# Patient Record
Sex: FEMALE | Race: WHITE | NOT HISPANIC OR LATINO
[De-identification: names, ages, dates, MRNs, and addresses within clinical notes are randomized per-mention and may not be internally consistent; named-entity substitution may affect disease eponyms.]

---

## 2018-01-12 ENCOUNTER — RESULT REVIEW (OUTPATIENT)
Age: 63
End: 2018-01-12

## 2019-04-10 ENCOUNTER — RESULT REVIEW (OUTPATIENT)
Age: 64
End: 2019-04-10

## 2020-07-05 ENCOUNTER — TRANSCRIPTION ENCOUNTER (OUTPATIENT)
Age: 65
End: 2020-07-05

## 2020-10-16 ENCOUNTER — TRANSCRIPTION ENCOUNTER (OUTPATIENT)
Age: 65
End: 2020-10-16

## 2021-06-09 ENCOUNTER — TRANSCRIPTION ENCOUNTER (OUTPATIENT)
Age: 66
End: 2021-06-09

## 2021-07-09 ENCOUNTER — TRANSCRIPTION ENCOUNTER (OUTPATIENT)
Age: 66
End: 2021-07-09

## 2021-07-29 ENCOUNTER — APPOINTMENT (OUTPATIENT)
Dept: UROLOGY | Facility: CLINIC | Age: 66
End: 2021-07-29
Payer: MEDICARE

## 2021-07-29 DIAGNOSIS — Z80.8 FAMILY HISTORY OF MALIGNANT NEOPLASM OF OTHER ORGANS OR SYSTEMS: ICD-10-CM

## 2021-07-29 DIAGNOSIS — Z78.9 OTHER SPECIFIED HEALTH STATUS: ICD-10-CM

## 2021-07-29 DIAGNOSIS — Z63.4 DISAPPEARANCE AND DEATH OF FAMILY MEMBER: ICD-10-CM

## 2021-07-29 DIAGNOSIS — Z80.3 FAMILY HISTORY OF MALIGNANT NEOPLASM OF BREAST: ICD-10-CM

## 2021-07-29 DIAGNOSIS — Z86.79 PERSONAL HISTORY OF OTHER DISEASES OF THE CIRCULATORY SYSTEM: ICD-10-CM

## 2021-07-29 PROCEDURE — 99203 OFFICE O/P NEW LOW 30 MIN: CPT

## 2021-07-29 SDOH — SOCIAL STABILITY - SOCIAL INSECURITY: DISSAPEARANCE AND DEATH OF FAMILY MEMBER: Z63.4

## 2021-07-29 NOTE — REVIEW OF SYSTEMS
[Constipation] : constipation [both] : pain during and after intercourse [denies] : denies pain with orgasm [Wake up at night to urinate  How many times?  ___] : wakes up to urinate [unfilled] times during the night [Anxiety] : anxiety [Negative] : Heme/Lymph [FreeTextEntry6] : painful urination

## 2021-07-29 NOTE — PHYSICAL EXAM
[Normal Appearance] : normal appearance [General Appearance - In No Acute Distress] : no acute distress [FreeTextEntry1] : normal peripheral circulation  [] : no respiratory distress [Normal Station and Gait] : the gait and station were normal for the patient's age [Skin Color & Pigmentation] : normal skin color and pigmentation [No Focal Deficits] : no focal deficits [Oriented To Time, Place, And Person] : oriented to person, place, and time

## 2021-07-29 NOTE — ASSESSMENT
[FreeTextEntry1] : Recurrent UTIs:\par Recommended: good oral hydration, timed voiding, urinate right after sex, change sanitary pads often, avoid douches, bubble baths and other feminine hygiene products. \par Recommended OTC Cranberry tablets. \par Recommended post coital prophylaxis with Macrobid 100 mg 30 mins after sexual activity. \par Discussed that if she keeps having UTIs options would be to either use low dose vaginal estrogen cream or low prophylactic antibiotics.\par \par Will get Urinalysis and Urine culture. \par Will get Renal and Bladder Ultrasound. \par Will inform results. \par \par Return to office in 3 months or sooner if any issues.

## 2021-07-29 NOTE — HISTORY OF PRESENT ILLNESS
[FreeTextEntry1] : 64 yo female presents for recurrent urinary tract infections. \par Had 2 urinary tract infection in last 5 weeks. Thinks related to sexual activity. \par UTIs are characterized by urinary frequency and urethral discomfort. \par Daytime frequency is 2-3 x  or so. Nocturia of 1-2 x. \par Denies dysuria, hematuria, lower abdominal or flank pain, fever, chills or rigors.\par No history of kidney stone. \par Non smoker. \par

## 2021-07-30 LAB
APPEARANCE: ABNORMAL
BACTERIA: ABNORMAL
BILIRUBIN URINE: NEGATIVE
BLOOD URINE: NEGATIVE
COLOR: YELLOW
GLUCOSE QUALITATIVE U: NEGATIVE
HYALINE CASTS: 4 /LPF
KETONES URINE: NEGATIVE
LEUKOCYTE ESTERASE URINE: ABNORMAL
MICROSCOPIC-UA: NORMAL
NITRITE URINE: NEGATIVE
PH URINE: 7
PROTEIN URINE: NORMAL
RED BLOOD CELLS URINE: 1 /HPF
SPECIFIC GRAVITY URINE: 1.03
SQUAMOUS EPITHELIAL CELLS: 0 /HPF
UROBILINOGEN URINE: NORMAL
WHITE BLOOD CELLS URINE: 135 /HPF

## 2021-08-06 ENCOUNTER — NON-APPOINTMENT (OUTPATIENT)
Age: 66
End: 2021-08-06

## 2021-08-10 LAB — BACTERIA UR CULT: ABNORMAL

## 2021-08-19 ENCOUNTER — APPOINTMENT (OUTPATIENT)
Dept: UROLOGY | Facility: CLINIC | Age: 66
End: 2021-08-19
Payer: MEDICARE

## 2021-08-19 VITALS
DIASTOLIC BLOOD PRESSURE: 75 MMHG | SYSTOLIC BLOOD PRESSURE: 117 MMHG | HEIGHT: 63 IN | HEART RATE: 65 BPM | WEIGHT: 147 LBS | BODY MASS INDEX: 26.05 KG/M2 | OXYGEN SATURATION: 98 %

## 2021-08-19 PROCEDURE — 99213 OFFICE O/P EST LOW 20 MIN: CPT

## 2021-08-20 LAB
APPEARANCE: CLEAR
BACTERIA: NEGATIVE
BILIRUBIN URINE: NEGATIVE
BLOOD URINE: NEGATIVE
COLOR: NORMAL
GLUCOSE QUALITATIVE U: NEGATIVE
HYALINE CASTS: 1 /LPF
KETONES URINE: NEGATIVE
LEUKOCYTE ESTERASE URINE: NEGATIVE
MICROSCOPIC-UA: NORMAL
NITRITE URINE: NEGATIVE
PH URINE: 6
PROTEIN URINE: NEGATIVE
RED BLOOD CELLS URINE: 0 /HPF
SPECIFIC GRAVITY URINE: 1.02
SQUAMOUS EPITHELIAL CELLS: 1 /HPF
UROBILINOGEN URINE: NORMAL
WHITE BLOOD CELLS URINE: 0 /HPF

## 2021-08-24 LAB — BACTERIA UR CULT: NORMAL

## 2021-08-26 ENCOUNTER — OUTPATIENT (OUTPATIENT)
Dept: OUTPATIENT SERVICES | Facility: HOSPITAL | Age: 66
LOS: 1 days | End: 2021-08-26
Payer: MEDICARE

## 2021-08-26 ENCOUNTER — APPOINTMENT (OUTPATIENT)
Dept: ULTRASOUND IMAGING | Facility: CLINIC | Age: 66
End: 2021-08-26
Payer: MEDICARE

## 2021-08-26 ENCOUNTER — TRANSCRIPTION ENCOUNTER (OUTPATIENT)
Age: 66
End: 2021-08-26

## 2021-08-26 DIAGNOSIS — Z87.440 PERSONAL HISTORY OF URINARY (TRACT) INFECTIONS: ICD-10-CM

## 2021-08-26 PROCEDURE — 76770 US EXAM ABDO BACK WALL COMP: CPT | Mod: 26

## 2021-08-26 PROCEDURE — 76770 US EXAM ABDO BACK WALL COMP: CPT

## 2021-08-29 NOTE — HISTORY OF PRESENT ILLNESS
[FreeTextEntry1] : 64 yo female presents for follow up. \par After last visit treated with Antibiotics.  \par Currently asymptomatic.  \par Renal and Bladder Ultrasound pending.  \par \par Initially seen for recurrent urinary tract infections. \par Had 2 urinary tract infection in last 5 weeks. Thinks related to sexual activity. \par UTIs are characterized by urinary frequency and urethral discomfort. \par Daytime frequency is 2-3 x  or so. Nocturia of 1-2 x. \par Denied dysuria, hematuria, lower abdominal or flank pain, fever, chills or rigors.\par No history of kidney stone. \par Non smoker. \par

## 2021-08-29 NOTE — ASSESSMENT
[FreeTextEntry1] : Recurrent urinary tract infections:\par Will get Urinalysis and Urine culture.\par Renal and Bladder Ultrasound pending. \par Will inform results. \par Recommended post coital prophylaxis with Macrobid 100 mg 30 mins after sexual activity. \par \par Return to office in 3 months or sooner if any issues.

## 2021-12-02 ENCOUNTER — APPOINTMENT (OUTPATIENT)
Dept: UROLOGY | Facility: CLINIC | Age: 66
End: 2021-12-02
Payer: MEDICARE

## 2021-12-02 VITALS
DIASTOLIC BLOOD PRESSURE: 76 MMHG | BODY MASS INDEX: 26.4 KG/M2 | OXYGEN SATURATION: 97 % | WEIGHT: 149 LBS | SYSTOLIC BLOOD PRESSURE: 133 MMHG | HEIGHT: 63 IN | HEART RATE: 60 BPM

## 2021-12-02 PROCEDURE — 51741 ELECTRO-UROFLOWMETRY FIRST: CPT

## 2021-12-02 PROCEDURE — 99214 OFFICE O/P EST MOD 30 MIN: CPT | Mod: 25

## 2021-12-02 PROCEDURE — 51798 US URINE CAPACITY MEASURE: CPT

## 2021-12-02 NOTE — ASSESSMENT
[FreeTextEntry1] : Recurrent urinary tract infections:\par Discussed Renal and Bladder Ultrasound. \par Continue post coital prophylaxis. \par \par Incomplete bladder emptying:\par See Uroflo and PVR. \par \par \par Return to office in 1 year or sooner if any issues.

## 2021-12-02 NOTE — HISTORY OF PRESENT ILLNESS
[FreeTextEntry1] : 67 yo female presents for follow up. \par Had informed of Renal and Bladder Ultrasound: increased PVR((on imaging 196 ml). \par Recommended to see Gynecology to rule out bladder prolapse. \par Saw Gynecology, no concern for bladder prolapse. \par Doing post coital prophylaxis, no new urinary tract infection. \par Daytime frequency is 2-3 x or so. Nocturia of 1-2 x. \par Has off and on sense of incomplete emptying and urinary incontinence. \par Denied dysuria, hematuria, lower abdominal or flank pain, fever, chills or rigors.\par \par Initially seen for recurrent urinary tract infections. \par Had 2 urinary tract infection in few weeks. Felt related to sexual activity. \par UTIs characterized by urinary frequency and urethral discomfort. \par Daytime frequency is 2-3 x  or so. Nocturia of 1-2 x. \par Denied dysuria, hematuria, lower abdominal or flank pain, fever, chills or rigors.\par No history of kidney stone. \par Non smoker. \par

## 2021-12-03 LAB
APPEARANCE: CLEAR
BILIRUBIN URINE: NEGATIVE
BLOOD URINE: NEGATIVE
COLOR: NORMAL
GLUCOSE QUALITATIVE U: NEGATIVE
KETONES URINE: NEGATIVE
LEUKOCYTE ESTERASE URINE: NEGATIVE
NITRITE URINE: NEGATIVE
PH URINE: 6
PROTEIN URINE: NEGATIVE
SPECIFIC GRAVITY URINE: 1.02
UROBILINOGEN URINE: NORMAL

## 2022-03-04 ENCOUNTER — TRANSCRIPTION ENCOUNTER (OUTPATIENT)
Age: 67
End: 2022-03-04

## 2022-06-12 ENCOUNTER — NON-APPOINTMENT (OUTPATIENT)
Age: 67
End: 2022-06-12

## 2022-10-19 ENCOUNTER — NON-APPOINTMENT (OUTPATIENT)
Age: 67
End: 2022-10-19

## 2023-02-07 ENCOUNTER — RX RENEWAL (OUTPATIENT)
Age: 68
End: 2023-02-07

## 2023-02-18 ENCOUNTER — NON-APPOINTMENT (OUTPATIENT)
Age: 68
End: 2023-02-18

## 2023-02-20 ENCOUNTER — NON-APPOINTMENT (OUTPATIENT)
Age: 68
End: 2023-02-20

## 2023-02-20 RX ORDER — NITROFURANTOIN (MONOHYDRATE/MACROCRYSTALS) 25; 75 MG/1; MG/1
100 CAPSULE ORAL
Qty: 14 | Refills: 0 | Status: COMPLETED | COMMUNITY
Start: 2021-08-03 | End: 2023-02-20

## 2023-02-21 ENCOUNTER — RX RENEWAL (OUTPATIENT)
Age: 68
End: 2023-02-21

## 2023-03-23 ENCOUNTER — APPOINTMENT (OUTPATIENT)
Dept: UROLOGY | Facility: CLINIC | Age: 68
End: 2023-03-23
Payer: MEDICARE

## 2023-03-23 VITALS
BODY MASS INDEX: 26.58 KG/M2 | HEIGHT: 63 IN | SYSTOLIC BLOOD PRESSURE: 134 MMHG | WEIGHT: 150 LBS | HEART RATE: 77 BPM | OXYGEN SATURATION: 97 % | DIASTOLIC BLOOD PRESSURE: 83 MMHG

## 2023-03-23 DIAGNOSIS — Z87.440 PERSONAL HISTORY OF URINARY (TRACT) INFECTIONS: ICD-10-CM

## 2023-03-23 PROCEDURE — 99213 OFFICE O/P EST LOW 20 MIN: CPT

## 2023-03-26 PROBLEM — Z87.440 RECENT URINARY TRACT INFECTION: Noted: 2021-07-29

## 2023-03-26 NOTE — HISTORY OF PRESENT ILLNESS
[FreeTextEntry1] : 66 yo female presents for follow up. \par Same urination.  \par Has occasional urinary incontinence with cough, sneeze and early morning.\par Doing post coital prophylaxis. \par Has developed Rheumatoid arthritis. On Methotrexate.\par Had labs with PCP today. \par \par On Renal and Bladder Ultrasound: increased PVR(on imaging 196 ml). \par Recommended to see Gynecology to rule out bladder prolapse. \par Saw Gynecology, no concern for bladder prolapse. \par \par Initially seen for recurrent urinary tract infections. \par Had 2 urinary tract infection in few weeks. Felt related to sexual activity. \par UTIs characterized by urinary frequency and urethral discomfort. \par Daytime frequency is 2-3 x  or so. Nocturia of 1-2 x. \par Denied dysuria, hematuria, lower abdominal or flank pain, fever, chills or rigors.\par No history of kidney stone. \par Non smoker. \par

## 2023-03-26 NOTE — ASSESSMENT
[FreeTextEntry1] : Recurrent urinary tract infections:\par Discussed treatment options. Will consider using Estrogen cream. \par Will continue post coital prophylaxis. \par \par Return to office in 1 year or sooner if any issues.

## 2023-06-09 ENCOUNTER — APPOINTMENT (OUTPATIENT)
Dept: ORTHOPEDIC SURGERY | Facility: CLINIC | Age: 68
End: 2023-06-09
Payer: MEDICARE

## 2023-06-09 VITALS — WEIGHT: 150 LBS | BODY MASS INDEX: 26.58 KG/M2 | HEIGHT: 63 IN

## 2023-06-09 DIAGNOSIS — Z87.39 PERSONAL HISTORY OF OTHER DISEASES OF THE MUSCULOSKELETAL SYSTEM AND CONNECTIVE TISSUE: ICD-10-CM

## 2023-06-09 DIAGNOSIS — Z00.00 ENCOUNTER FOR GENERAL ADULT MEDICAL EXAMINATION W/OUT ABNORMAL FINDINGS: ICD-10-CM

## 2023-06-09 PROCEDURE — 73110 X-RAY EXAM OF WRIST: CPT | Mod: LT

## 2023-06-09 PROCEDURE — 73080 X-RAY EXAM OF ELBOW: CPT | Mod: LT

## 2023-06-09 PROCEDURE — 99203 OFFICE O/P NEW LOW 30 MIN: CPT | Mod: 25

## 2023-06-09 PROCEDURE — A4565: CPT | Mod: LT,KX

## 2023-06-09 NOTE — IMAGING
[de-identified] : L elbow\par mild swelling, ecchymosis over inner elbow\par no deformity\par ttp radial head\par pain with pronation/supination\par \par L wrist\par no swelling, no deformity\par minimal ttp to distal radius\par full ROM\par neuro intact

## 2023-06-09 NOTE — HISTORY OF PRESENT ILLNESS
[10] : 10 [2] : 2 [Dull/Aching] : dull/aching [Localized] : localized [Sharp] : sharp [Constant] : constant [Retired] : Work status: retired [de-identified] : 67F here with left elbow and left wrist pain. She fell 6/7 in [place]\par She has elbow pain which radiats down her forearm. pain with pronation/supination\par PMH: RA [] : Post Surgical Visit: no [FreeTextEntry1] : Lt Forearm/elbow [FreeTextEntry3] : 6/7/23 [FreeTextEntry5] : Patient tripped and fell down while walking on the board walk landed on her left arm 6/7/23 [de-identified] : movement

## 2023-06-09 NOTE — ASSESSMENT
[FreeTextEntry1] : XR with displaced intra articular radial head fx\par Sling for a few days\par Ice, rest, nsaids\par FU mitgang wednesday

## 2023-06-14 ENCOUNTER — APPOINTMENT (OUTPATIENT)
Dept: ORTHOPEDIC SURGERY | Facility: CLINIC | Age: 68
End: 2023-06-14
Payer: MEDICARE

## 2023-06-14 VITALS — WEIGHT: 150 LBS | BODY MASS INDEX: 26.58 KG/M2 | HEIGHT: 63 IN

## 2023-06-14 PROCEDURE — 99214 OFFICE O/P EST MOD 30 MIN: CPT | Mod: 57

## 2023-06-14 PROCEDURE — 24650 CLTX RDL HEAD/NCK FX WO MNPJ: CPT | Mod: LT

## 2023-06-14 NOTE — ASSESSMENT
[FreeTextEntry1] : The patient was advised of the diagnosis.  The natural history of the pathology was explained in full to the patient in layman's terms. All questions were answered.  We discussed that the radial head has a function in mobility and support of the elbow joint.  Minimally displaced fractures of the radial head/neck generally do well with non operative treatment.  We reviewed that although many people lose terminal extension, this is tolerated and that functional range of motion is considered to be  flexion extension and 50/50 supination/pronation.  The patient understands that they need to work on range of motion exercises and that Xrays and range of motion will be re-evaluated in about 3 weeks.  Formal therapy may be needed.  For comfort I have advised the patient that they may wear a sling for up to 3 days after the injury but then should begin range of motion exercises to minimize stiffness. Prolonged sling wear may cause permanent stiffness.  Surgery is occasionally required for contracture release or fixation/radial head arthroplasty.  The patient verbalized understanding of the above.\par \par Start PT for NWB ROM\par F/u in 2 weeks for xrays

## 2023-06-14 NOTE — IMAGING
[de-identified] : L elbow\par mild swelling, ecchymosis over inner elbow\par no deformity\par ttp radial head\par pain with pronation/supination\par \par L wrist\par no swelling, no deformity\par minimal ttp to distal radius\par full ROM\par neuro intact

## 2023-06-14 NOTE — HISTORY OF PRESENT ILLNESS
[10] : 10 [2] : 2 [Dull/Aching] : dull/aching [Localized] : localized [Sharp] : sharp [Constant] : constant [Retired] : Work status: retired [de-identified] : 6/14/23:  [place] in [place] on 6/7 and injured her left elbow.  Pt was seen UC by MANAN Dias on 6/9/23.\par  [] : Post Surgical Visit: no [FreeTextEntry1] : Lt Forearm/elbow [FreeTextEntry3] : 6/7/23 [FreeTextEntry5] : Patient tripped and fell down while walking on the board walk landed on her left arm 6/7/23 [de-identified] : movement

## 2023-06-22 ENCOUNTER — APPOINTMENT (OUTPATIENT)
Dept: UROLOGY | Facility: CLINIC | Age: 68
End: 2023-06-22
Payer: MEDICARE

## 2023-06-22 PROCEDURE — 99214 OFFICE O/P EST MOD 30 MIN: CPT

## 2023-06-23 LAB
APPEARANCE: CLEAR
BACTERIA: NEGATIVE /HPF
BILIRUBIN URINE: NEGATIVE
BLOOD URINE: NEGATIVE
CAST: 0 /LPF
COLOR: YELLOW
EPITHELIAL CELLS: 1 /HPF
GLUCOSE QUALITATIVE U: NEGATIVE MG/DL
KETONES URINE: NEGATIVE MG/DL
LEUKOCYTE ESTERASE URINE: NEGATIVE
MICROSCOPIC-UA: NORMAL
NITRITE URINE: NEGATIVE
PH URINE: 6.5
PROTEIN URINE: NEGATIVE MG/DL
RED BLOOD CELLS URINE: 1 /HPF
SPECIFIC GRAVITY URINE: 1.01
UROBILINOGEN URINE: 0.2 MG/DL
WHITE BLOOD CELLS URINE: 0 /HPF

## 2023-06-25 NOTE — ASSESSMENT
[FreeTextEntry1] : Exam conducted in the presence of female chaperone: KANG Ye. \par \par Recurrent urinary tract infections:\par Incomplete bladder emptying:\par PVR: 10 ml. \par Discussed possibly has UTI causing feeling of incomplete emptying.\par Will get Urinalysis and Urine culture. \par Will start Nitrofurantoin 100 mg BID x 7 days. \par If negative for UTI will consider cystoscopy.\par \par Will inform results.\par \par \par \par

## 2023-06-25 NOTE — HISTORY OF PRESENT ILLNESS
[FreeTextEntry1] : 66 yo female presents for follow up. \par Saw Gynecology, no Estrogen cream was prescribed.\par Encouraged Kegel's exercise and  to do timed voiding every 2 hours.\par Has reasonable stream, no hesitancy or straining.\par Daytime frequency is 2-3 x  or so. Nocturia of 1-2 x. \par Complaining of sense of incomplete emptying. \par Denies dysuria, hematuria, lower abdominal or flank pain, nausea, vomiting, fever, chills or rigors. \par Doing post coital prophylaxis. No new urinary tract infection. \par Past occasional smoker as Teen. \par Boyfriend for 2 years, was smoker. \par In the past  was smoker. \par \par Has developed Rheumatoid arthritis. On Methotrexate.\par \par On Renal and Bladder Ultrasound: increased PVR(on imaging 196 ml). \par Recommended to see Gynecology to rule out bladder prolapse. \par Saw Gynecology, no concern for bladder prolapse. \par \par Initially seen for recurrent urinary tract infections. \par Had 2 urinary tract infection in few weeks. Felt related to sexual activity. \par UTIs characterized by urinary frequency and urethral discomfort. \par Daytime frequency is 2-3 x  or so. Nocturia of 1-2 x. \par Denied dysuria, hematuria, lower abdominal or flank pain, fever, chills or rigors.\par No history of kidney stone. \par Non smoker. \par

## 2023-06-25 NOTE — LETTER BODY
[Dear  ___] : Dear  [unfilled], [Courtesy Letter:] : I had the pleasure of seeing your patient, [unfilled], in my office today. [Please see my note below.] : Please see my note below. [Sincerely,] : Sincerely, [FreeTextEntry3] : Rinku Dudley MD\par  of Urology\par [facility]\par \par [facility]\par Offices:\par [address] [facility], [facility]\par [address] [address]\par [address] [facility], [address]\par \par TEL: [phone]\par FAX: [phone]

## 2023-06-26 ENCOUNTER — NON-APPOINTMENT (OUTPATIENT)
Age: 68
End: 2023-06-26

## 2023-06-26 LAB — BACTERIA UR CULT: NORMAL

## 2023-07-06 ENCOUNTER — APPOINTMENT (OUTPATIENT)
Dept: UROLOGY | Facility: CLINIC | Age: 68
End: 2023-07-06

## 2023-07-19 ENCOUNTER — APPOINTMENT (OUTPATIENT)
Dept: ORTHOPEDIC SURGERY | Facility: CLINIC | Age: 68
End: 2023-07-19
Payer: MEDICARE

## 2023-07-19 VITALS — WEIGHT: 150 LBS | HEIGHT: 63 IN | BODY MASS INDEX: 26.58 KG/M2

## 2023-07-19 DIAGNOSIS — S52.122A DISPLACED FRACTURE OF HEAD OF LEFT RADIUS, INITIAL ENCOUNTER FOR CLOSED FRACTURE: ICD-10-CM

## 2023-07-19 PROCEDURE — 73080 X-RAY EXAM OF ELBOW: CPT | Mod: LT

## 2023-07-19 PROCEDURE — 99024 POSTOP FOLLOW-UP VISIT: CPT

## 2023-07-20 ENCOUNTER — APPOINTMENT (OUTPATIENT)
Dept: UROLOGY | Facility: CLINIC | Age: 68
End: 2023-07-20
Payer: MEDICARE

## 2023-07-20 PROCEDURE — 99213 OFFICE O/P EST LOW 20 MIN: CPT

## 2023-07-23 NOTE — LETTER BODY
[Dear  ___] : Dear  [unfilled], [Courtesy Letter:] : I had the pleasure of seeing your patient, [unfilled], in my office today. [Please see my note below.] : Please see my note below. [Sincerely,] : Sincerely, [FreeTextEntry3] : Rinku Dudley MD\par  of Urology\[facility]\par \par [facility]\par Offices:\par [address] [facility], [facility]\par [address] [address]\par [address] [facility], [address]\par \par TEL: [phone]\par FAX: [phone]

## 2023-07-23 NOTE — HISTORY OF PRESENT ILLNESS
[FreeTextEntry1] : 68 yo female presents for follow up. \par After last visit had informed of negative urine culture.  Recommended cystoscopy if persistent bother.\par Feeling better than before.  Still has on and off sense of incomplete emptying.  Has urinary incontinence on waking up before getting to the bathroom.\par \par Seen on 6/22/2023.\par Saw Gynecology, no Estrogen cream was prescribed.\par Encouraged Kegel's exercise and  to do timed voiding every 2 hours.\par Had reasonable stream, no hesitancy or straining.\par Daytime frequency is 2-3 x  or so. Nocturia of 1-2 x. \par Complained of sense of incomplete emptying. \par Denied dysuria, hematuria, lower abdominal or flank pain, nausea, vomiting, fever, chills or rigors. \par Was doing post coital prophylaxis. No new urinary tract infection. \par Past occasional smoker as Teen. \par Boyfriend for 2 years, was smoker. \par In the past  was smoker. \par \par Has developed Rheumatoid arthritis. On Methotrexate.\par \par On Renal and Bladder Ultrasound: increased PVR(on imaging 196 ml). \par Recommended to see Gynecology to rule out bladder prolapse. \par Saw Gynecology, no concern for bladder prolapse. \par \par Initially seen for recurrent urinary tract infections. \par Had 2 urinary tract infection in few weeks. Felt related to sexual activity. \par UTIs characterized by urinary frequency and urethral discomfort. \par Daytime frequency is 2-3 x  or so. Nocturia of 1-2 x. \par Denied dysuria, hematuria, lower abdominal or flank pain, fever, chills or rigors.\par No history of kidney stone. \par Non smoker. \par

## 2023-07-23 NOTE — ASSESSMENT
[FreeTextEntry1] : Reviewed records.\par Discussed labs. \par \par \par Recurrent urinary tract infections:\par Sense of incomplete emptying:\par Discussed treatment options.\par Will hold off on cystoscopy.\par Will continue postcoital prophylaxis.\par \par Recommended to follow-up with Dr. Maza.

## 2023-07-25 NOTE — ASSESSMENT
[FreeTextEntry1] : The patient was advised of the diagnosis.  The natural history of the pathology was explained in full to the patient in layman's terms. All questions were answered.  We discussed that the radial head has a function in mobility and support of the elbow joint.  Minimally displaced fractures of the radial head/neck generally do well with non operative treatment.  We reviewed that although many people lose terminal extension, this is tolerated and that functional range of motion is considered to be  flexion extension and 50/50 supination/pronation.  The patient understands that they need to work on range of motion exercises and that Xrays and range of motion will be re-evaluated in about 3 weeks.  Formal therapy may be needed.  For comfort I have advised the patient that they may wear a sling for up to 3 days after the injury but then should begin range of motion exercises to minimize stiffness. Prolonged sling wear may cause permanent stiffness.  Surgery is occasionally required for contracture release or fixation/radial head arthroplasty.  The patient verbalized understanding of the above.\par \par C/w PT\par Pt can return to activity as tolerated

## 2023-07-25 NOTE — IMAGING
[Left] : left elbow [de-identified] : left elbow:\par rom 5-140\par minimal radial head ttp [FreeTextEntry1] : healed radial head fracture

## 2023-07-25 NOTE — HISTORY OF PRESENT ILLNESS
[10] : 10 [2] : 2 [Dull/Aching] : dull/aching [Localized] : localized [Sharp] : sharp [Constant] : constant [Retired] : Work status: retired [de-identified] : 7/19/23:  Pt has been doing therapy and has minimal pain\par \par 6/14/23:  [place] in [place] on 6/7 and injured her left elbow.  Pt was seen UC by MANAN Dias on 6/9/23.\par  [] : Post Surgical Visit: no [FreeTextEntry1] : Lt Forearm/elbow [FreeTextEntry3] : 6/7/23 [FreeTextEntry5] : Patient tripped and fell down while walking on the board walk landed on her left arm 6/7/23 [de-identified] : movement

## 2023-10-19 ENCOUNTER — TRANSCRIPTION ENCOUNTER (OUTPATIENT)
Age: 68
End: 2023-10-19

## 2023-10-23 ENCOUNTER — APPOINTMENT (OUTPATIENT)
Dept: UROLOGY | Facility: CLINIC | Age: 68
End: 2023-10-23
Payer: MEDICARE

## 2023-10-23 VITALS
SYSTOLIC BLOOD PRESSURE: 145 MMHG | OXYGEN SATURATION: 96 % | DIASTOLIC BLOOD PRESSURE: 80 MMHG | BODY MASS INDEX: 26.58 KG/M2 | HEART RATE: 56 BPM | WEIGHT: 150 LBS | HEIGHT: 63 IN | RESPIRATION RATE: 16 BRPM

## 2023-10-23 DIAGNOSIS — N39.0 URINARY TRACT INFECTION, SITE NOT SPECIFIED: ICD-10-CM

## 2023-10-23 PROCEDURE — 99214 OFFICE O/P EST MOD 30 MIN: CPT

## 2023-10-23 PROCEDURE — 51798 US URINE CAPACITY MEASURE: CPT

## 2023-10-23 RX ORDER — ROSUVASTATIN CALCIUM 5 MG/1
TABLET, FILM COATED ORAL
Refills: 0 | Status: ACTIVE | COMMUNITY

## 2023-10-23 RX ORDER — TAFASITAMAB-CXIX 200 MG/5ML
INJECTION, POWDER, LYOPHILIZED, FOR SOLUTION INTRAVENOUS
Refills: 0 | Status: ACTIVE | COMMUNITY

## 2024-01-10 ENCOUNTER — APPOINTMENT (OUTPATIENT)
Dept: UROLOGY | Facility: CLINIC | Age: 69
End: 2024-01-10
Payer: MEDICARE

## 2024-01-10 VITALS
HEART RATE: 56 BPM | DIASTOLIC BLOOD PRESSURE: 80 MMHG | HEIGHT: 63 IN | RESPIRATION RATE: 16 BRPM | WEIGHT: 143 LBS | BODY MASS INDEX: 25.34 KG/M2 | SYSTOLIC BLOOD PRESSURE: 128 MMHG | OXYGEN SATURATION: 98 %

## 2024-01-10 DIAGNOSIS — N95.2 POSTMENOPAUSAL ATROPHIC VAGINITIS: ICD-10-CM

## 2024-01-10 DIAGNOSIS — R33.9 RETENTION OF URINE, UNSPECIFIED: ICD-10-CM

## 2024-01-10 DIAGNOSIS — N39.3 STRESS INCONTINENCE (FEMALE) (MALE): ICD-10-CM

## 2024-01-10 PROCEDURE — 99213 OFFICE O/P EST LOW 20 MIN: CPT

## 2024-01-10 RX ORDER — ACETAMINOPHEN AND CODEINE PHOSPHATE 300; 30 MG/1; MG/1
300-30 TABLET ORAL
Qty: 25 | Refills: 0 | Status: COMPLETED | COMMUNITY
Start: 2023-06-14 | End: 2024-01-10

## 2024-01-10 RX ORDER — BIOTIN 10 MG
TABLET ORAL
Refills: 0 | Status: ACTIVE | COMMUNITY

## 2024-01-10 RX ORDER — NITROFURANTOIN (MONOHYDRATE/MACROCRYSTALS) 25; 75 MG/1; MG/1
100 CAPSULE ORAL TWICE DAILY
Qty: 14 | Refills: 0 | Status: COMPLETED | COMMUNITY
Start: 2021-07-29 | End: 2024-01-10

## 2024-01-10 RX ORDER — GABAPENTIN 300 MG/1
300 CAPSULE ORAL TWICE DAILY
Refills: 0 | Status: ACTIVE | COMMUNITY

## 2024-01-10 RX ORDER — ESCITALOPRAM OXALATE 10 MG/1
10 TABLET ORAL
Refills: 0 | Status: ACTIVE | COMMUNITY

## 2024-01-10 RX ORDER — METHOTREXATE 2.5 MG/1
2.5 TABLET ORAL
Refills: 0 | Status: ACTIVE | COMMUNITY

## 2024-01-10 NOTE — ASSESSMENT
[FreeTextEntry1] : 67 y/o with resolved mild emily. Improved vaginal atrophy. Can continue OTC vaginal moisturizer every 2 weeks.  follow up 6 months or sooner if needed.  Urine next visit

## 2024-01-10 NOTE — PHYSICAL EXAM
[Normal Appearance] : normal appearance [Well Groomed] : well groomed [General Appearance - In No Acute Distress] : no acute distress [Edema] : no peripheral edema [Respiration, Rhythm And Depth] : normal respiratory rhythm and effort [Exaggerated Use Of Accessory Muscles For Inspiration] : no accessory muscle use [Abdomen Soft] : soft [Abdomen Tenderness] : non-tender [Costovertebral Angle Tenderness] : no ~M costovertebral angle tenderness [Urinary Bladder Findings] : the bladder was normal on palpation [Normal Station and Gait] : the gait and station were normal for the patient's age [] : no rash [No Focal Deficits] : no focal deficits [Oriented To Time, Place, And Person] : oriented to person, place, and time [Affect] : the affect was normal [Mood] : the mood was normal [No Palpable Adenopathy] : no palpable adenopathy [de-identified] : well nourished vagina with glistening mucosa. Tone 2/5 with coaching and cuing

## 2024-01-10 NOTE — HISTORY OF PRESENT ILLNESS
[Urinary Incontinence] : urinary incontinence [Fatigue] : fatigue [None] : None [FreeTextEntry1] : JOSE RIVERO  68 year F presents for follow up on vaginal atrophy and Mild emily. Compliant on Kegel exercises. No noted incontinence  On OTC vaginal moisturizer, feels its too much cream even it is doing it once per week.  No recent UTI symptoms. Off of Pericoital prophylaxis. No  recent symptoms of UTI last visit .  10/23 history of recurrent UTI. Last urine cx by PCP negative. Apprehensive to Estrace vaginal cream. MIld stress incontinence when has URI. Does not require pads. Sexually active. non painfully  hysterectomy for precancerous cells.

## 2024-03-16 ENCOUNTER — NON-APPOINTMENT (OUTPATIENT)
Age: 69
End: 2024-03-16

## 2024-07-10 ENCOUNTER — APPOINTMENT (OUTPATIENT)
Dept: UROLOGY | Facility: CLINIC | Age: 69
End: 2024-07-10
Payer: MEDICARE

## 2024-07-10 VITALS
SYSTOLIC BLOOD PRESSURE: 146 MMHG | WEIGHT: 142 LBS | HEIGHT: 63 IN | HEART RATE: 88 BPM | OXYGEN SATURATION: 99 % | RESPIRATION RATE: 16 BRPM | BODY MASS INDEX: 25.16 KG/M2 | DIASTOLIC BLOOD PRESSURE: 91 MMHG

## 2024-07-10 DIAGNOSIS — N95.2 POSTMENOPAUSAL ATROPHIC VAGINITIS: ICD-10-CM

## 2024-07-10 DIAGNOSIS — N39.3 STRESS INCONTINENCE (FEMALE) (MALE): ICD-10-CM

## 2024-07-10 LAB
BILIRUB UR QL STRIP: NEGATIVE
CLARITY UR: NORMAL
COLLECTION METHOD: NORMAL
GLUCOSE UR-MCNC: NEGATIVE
HCG UR QL: 0.2 EU/DL
HGB UR QL STRIP.AUTO: NEGATIVE
KETONES UR-MCNC: NEGATIVE
LEUKOCYTE ESTERASE UR QL STRIP: NORMAL
NITRITE UR QL STRIP: POSITIVE
PH UR STRIP: 6
PROT UR STRIP-MCNC: NEGATIVE
SP GR UR STRIP: >=1.03

## 2024-07-10 PROCEDURE — 99214 OFFICE O/P EST MOD 30 MIN: CPT

## 2024-07-14 LAB — BACTERIA UR CULT: ABNORMAL

## 2024-08-22 ENCOUNTER — NON-APPOINTMENT (OUTPATIENT)
Age: 69
End: 2024-08-22

## 2024-08-27 ENCOUNTER — NON-APPOINTMENT (OUTPATIENT)
Age: 69
End: 2024-08-27

## 2025-01-08 ENCOUNTER — APPOINTMENT (OUTPATIENT)
Dept: UROLOGY | Facility: CLINIC | Age: 70
End: 2025-01-08